# Patient Record
Sex: FEMALE | Race: WHITE
[De-identification: names, ages, dates, MRNs, and addresses within clinical notes are randomized per-mention and may not be internally consistent; named-entity substitution may affect disease eponyms.]

---

## 2017-01-17 ENCOUNTER — HOSPITAL ENCOUNTER (OUTPATIENT)
Dept: HOSPITAL 84 - D.MAMMO | Age: 72
Discharge: HOME | End: 2017-01-17
Attending: OBSTETRICS & GYNECOLOGY
Payer: MEDICARE

## 2017-01-17 DIAGNOSIS — Z12.31: Primary | ICD-10-CM

## 2018-01-23 ENCOUNTER — HOSPITAL ENCOUNTER (OUTPATIENT)
Dept: HOSPITAL 84 - D.MAMMO | Age: 73
Discharge: HOME | End: 2018-01-23
Attending: OBSTETRICS & GYNECOLOGY
Payer: MEDICARE

## 2018-01-23 DIAGNOSIS — Z12.31: Primary | ICD-10-CM

## 2018-08-01 ENCOUNTER — HOSPITAL ENCOUNTER (OUTPATIENT)
Dept: HOSPITAL 84 - D.US | Age: 73
Discharge: HOME | End: 2018-08-01
Attending: NURSE PRACTITIONER
Payer: MEDICARE

## 2018-08-01 DIAGNOSIS — R22.2: Primary | ICD-10-CM

## 2019-01-25 ENCOUNTER — HOSPITAL ENCOUNTER (OUTPATIENT)
Dept: HOSPITAL 84 - D.MAMMO | Age: 74
Discharge: HOME | End: 2019-01-25
Attending: OBSTETRICS & GYNECOLOGY
Payer: MEDICARE

## 2019-01-25 DIAGNOSIS — Z12.31: Primary | ICD-10-CM

## 2019-02-20 ENCOUNTER — HOSPITAL ENCOUNTER (OUTPATIENT)
Dept: HOSPITAL 84 - D.MAMMO | Age: 74
Discharge: HOME | End: 2019-02-20
Attending: OBSTETRICS & GYNECOLOGY
Payer: MEDICARE

## 2019-02-20 DIAGNOSIS — R92.8: Primary | ICD-10-CM

## 2019-04-12 LAB
ERYTHROCYTE [DISTWIDTH] IN BLOOD BY AUTOMATED COUNT: 13.2 % (ref 11.5–14.5)
HCT VFR BLD CALC: 37.5 % (ref 36–48)
HGB BLD-MCNC: 13.3 G/DL (ref 12–16)
LYMPHOCYTES NFR BLD AUTO: 28.1 % (ref 15–50)
MCH RBC QN AUTO: 33.8 PG (ref 26–34)
MCHC RBC AUTO-ENTMCNC: 35.5 G/DL (ref 31–37)
MCV RBC: 95.4 FL (ref 80–100)
NEUTROPHILS NFR BLD AUTO: 61.1 % (ref 40–80)
PLATELET # BLD: 181 10X3/UL (ref 130–400)
PMV BLD AUTO: 9.3 FL (ref 7.4–10.4)
RBC # BLD AUTO: 3.93 10X6/UL (ref 4–5.4)
WBC # BLD AUTO: 3.2 10X3/UL (ref 4.8–10.8)

## 2019-04-15 ENCOUNTER — HOSPITAL ENCOUNTER (OUTPATIENT)
Dept: HOSPITAL 84 - D.OPS | Age: 74
Discharge: HOME | End: 2019-04-15
Payer: MEDICARE

## 2019-04-15 VITALS — BODY MASS INDEX: 24.4 KG/M2

## 2019-04-15 VITALS — DIASTOLIC BLOOD PRESSURE: 65 MMHG | SYSTOLIC BLOOD PRESSURE: 137 MMHG

## 2019-04-15 DIAGNOSIS — Z01.812: ICD-10-CM

## 2019-04-15 DIAGNOSIS — N71.1: ICD-10-CM

## 2019-04-15 DIAGNOSIS — N95.0: Primary | ICD-10-CM

## 2019-04-18 NOTE — OP
PATIENT NAME:  ESTEPHANIE CHANEL                              MEDICAL RECORD: [id number]
:47                                             LOCATION:D.OPS          
                                                         ADMISSION DATE:        
SURGEON:  RYLEE MONTANO MD          
 
 
DATE OF OPERATION:  04/15/2019
 
PREOPERATIVE DIAGNOSIS:  Postmenopausal bleeding.
 
POSTOPERATIVE DIAGNOSIS:
1.  Postmenopausal bleeding.
2.  Uterine atrophy.
 
PROCEDURE:  Hysteroscopy with dilation and curettage.
 
SURGEON:  Rylee Montano MD
 
CRNA:  Hardy Silva.
 
ANESTHESIOLOGIST:  Dr. Hernandez.
 
ANESTHETIC:  General.
 
FINDINGS:  Cervix and vaginal mucosa unremarkable.  At the time of hysteroscopy,
thin lining noted.  No masses.  Scant tissue returned at the time of D&C.
 
SPECIMEN REMOVED:  Uterine curettings.
 
SPECIMEN DISPOSITION:  Pathology.
 
ESTIMATED BLOOD LOSS:  Minimal.
 
FLUIDS:  600 cc lactated Ringer's.
 
URINE OUTPUT:  Quantity sufficient cath prior to this procedure.
 
COMPLICATIONS:  None.
 
DRAINS:  None.
 
INDICATIONS:  The patient is a 73-year-old female who continues to take hormone
replacement therapy with vaginal bleeding.  The patient had an increased
endometrial stripe on ultrasound and was consented for dilation and curettage
with hysteroscopy.
 
DESCRIPTION OF PROCEDURE:  After informed consent was assured, the patient was
taken to the operating room where anesthetic was obtained.  The patient was now
prepped and draped in the usual sterile fashion.  A speculum was introduced into
the vagina.  The cervix grasped and the cervix dilated.  A hysteroscope was
introduced and the cervical os and ostia are visualized.  The uterine cavity was
visualized in its entirety without evidence of mass.  Hysteroscopy was now
discontinued and a cervix dilated to accommodate a #2 curette.  Curette was
passed gently to the fundus and pressure applied to the uterine wall as it is
withdrawn.  Good cry obtained throughout with tissue returned.  The specimen was
sent to the pathologist.  Single tooth tenaculum that was used to steady the
cervix during this case is removed and a ring forceps placed on the puncture
sites.  Sponge, lap, and needle counts correct times 2.  Ring forceps were
 
 
 
OPERATIVE REPORT                               [id number]    ESTEPHANIE CHANEL            
 
 
removed prior to the patient being transferred from the operative table to the
[facility].
 
TRANSINT:[id number] Voice Confirmation ID: [id number] DOCUMENT ID: [id number]
                                           
                                           RYLEE MONTANO MD          
 
 
 
Electronically Signed by RYLEE MONTANO on 19 at 9029
 
 
 
 
 
 
 
 
 
 
 
 
 
 
 
 
 
 
 
 
 
 
 
 
 
 
 
 
 
 
 
 
 
 
 
 
 
CC:                                                             [id number]
DICTATION DATE: 04/15/19 1479     :     04/15/19 2237      [facility] 
                                                                      04/15/19
[id number] [place] [address]

## 2019-08-21 ENCOUNTER — HOSPITAL ENCOUNTER (OUTPATIENT)
Dept: HOSPITAL 84 - D.MAMMO | Age: 74
Discharge: HOME | End: 2019-08-21
Attending: OBSTETRICS & GYNECOLOGY
Payer: MEDICARE

## 2019-08-21 VITALS — BODY MASS INDEX: 24.4 KG/M2

## 2019-08-21 DIAGNOSIS — R92.8: Primary | ICD-10-CM

## 2019-08-28 ENCOUNTER — HOSPITAL ENCOUNTER (OUTPATIENT)
Dept: HOSPITAL 84 - D.US | Age: 74
Discharge: HOME | End: 2019-08-28
Attending: OBSTETRICS & GYNECOLOGY
Payer: MEDICARE

## 2019-08-28 VITALS — BODY MASS INDEX: 24.4 KG/M2

## 2019-08-28 DIAGNOSIS — N60.01: Primary | ICD-10-CM

## 2019-08-28 DIAGNOSIS — R92.8: ICD-10-CM

## 2019-10-04 LAB
ANION GAP SERPL CALC-SCNC: 11.3 MMOL/L (ref 8–16)
BASOPHILS NFR BLD AUTO: 1 % (ref 0–2)
BUN SERPL-MCNC: 9 MG/DL (ref 7–18)
CALCIUM SERPL-MCNC: 9.5 MG/DL (ref 8.5–10.1)
CHLORIDE SERPL-SCNC: 104 MMOL/L (ref 98–107)
CO2 SERPL-SCNC: 30.9 MMOL/L (ref 21–32)
CREAT SERPL-MCNC: 0.6 MG/DL (ref 0.6–1.3)
EOSINOPHIL NFR BLD: 2.3 % (ref 0–7)
ERYTHROCYTE [DISTWIDTH] IN BLOOD BY AUTOMATED COUNT: 13 % (ref 11.5–14.5)
GLUCOSE SERPL-MCNC: 96 MG/DL (ref 74–106)
HCT VFR BLD CALC: 41.5 % (ref 36–48)
HGB BLD-MCNC: 14 G/DL (ref 12–16)
IMM GRANULOCYTES NFR BLD: 0 % (ref 0–5)
LYMPHOCYTES NFR BLD AUTO: 27.8 % (ref 15–50)
MCH RBC QN AUTO: 33.3 PG (ref 26–34)
MCHC RBC AUTO-ENTMCNC: 33.7 G/DL (ref 31–37)
MCV RBC: 98.8 FL (ref 80–100)
MONOCYTES NFR BLD: 8.7 % (ref 2–11)
NEUTROPHILS NFR BLD AUTO: 60.2 % (ref 40–80)
OSMOLALITY SERPL CALC.SUM OF ELEC: 281 MOSM/KG (ref 275–300)
PLATELET # BLD: 224 10X3/UL (ref 130–400)
PMV BLD AUTO: 9.7 FL (ref 7.4–10.4)
POTASSIUM SERPL-SCNC: 4.2 MMOL/L (ref 3.5–5.1)
RBC # BLD AUTO: 4.2 10X6/UL (ref 4–5.4)
SODIUM SERPL-SCNC: 142 MMOL/L (ref 136–145)
WBC # BLD AUTO: 3.1 10X3/UL (ref 4.8–10.8)

## 2019-10-07 ENCOUNTER — HOSPITAL ENCOUNTER (OUTPATIENT)
Dept: HOSPITAL 84 - OBSVTIME | Age: 74
LOS: 1 days | Discharge: HOME | End: 2019-10-08
Attending: OBSTETRICS & GYNECOLOGY
Payer: MEDICARE

## 2019-10-07 VITALS — SYSTOLIC BLOOD PRESSURE: 124 MMHG | DIASTOLIC BLOOD PRESSURE: 65 MMHG

## 2019-10-07 VITALS — SYSTOLIC BLOOD PRESSURE: 118 MMHG | DIASTOLIC BLOOD PRESSURE: 60 MMHG

## 2019-10-07 VITALS — DIASTOLIC BLOOD PRESSURE: 66 MMHG | SYSTOLIC BLOOD PRESSURE: 130 MMHG

## 2019-10-07 VITALS — WEIGHT: 132.28 LBS | BODY MASS INDEX: 24.34 KG/M2 | HEIGHT: 62 IN

## 2019-10-07 VITALS — DIASTOLIC BLOOD PRESSURE: 82 MMHG | SYSTOLIC BLOOD PRESSURE: 139 MMHG

## 2019-10-07 VITALS — SYSTOLIC BLOOD PRESSURE: 137 MMHG | DIASTOLIC BLOOD PRESSURE: 66 MMHG

## 2019-10-07 VITALS — SYSTOLIC BLOOD PRESSURE: 112 MMHG | DIASTOLIC BLOOD PRESSURE: 61 MMHG

## 2019-10-07 VITALS — SYSTOLIC BLOOD PRESSURE: 135 MMHG | DIASTOLIC BLOOD PRESSURE: 62 MMHG

## 2019-10-07 VITALS — SYSTOLIC BLOOD PRESSURE: 124 MMHG | DIASTOLIC BLOOD PRESSURE: 61 MMHG

## 2019-10-07 VITALS — SYSTOLIC BLOOD PRESSURE: 110 MMHG | DIASTOLIC BLOOD PRESSURE: 59 MMHG

## 2019-10-07 DIAGNOSIS — N81.2: ICD-10-CM

## 2019-10-07 DIAGNOSIS — R87.619: ICD-10-CM

## 2019-10-07 DIAGNOSIS — N73.6: ICD-10-CM

## 2019-10-07 DIAGNOSIS — N95.8: Primary | ICD-10-CM

## 2019-10-07 DIAGNOSIS — N95.0: ICD-10-CM

## 2019-10-07 NOTE — NUR
SPOKE WITH DR. LAUREANO IN REGARDS TO PATIENT'S REQUEST. DR. LAUREANO ORDERED
PATIENT'S HOME DOSE OF PEPCID, 20MG BID.

## 2019-10-07 NOTE — NUR
PATIENT RESTINGI IN BED AND DENIES NEEDS AT THIS TIME. BED IN LOWEST POSITION
AND CALL LIGHT WITHIN REACH. VSS. ENCOURAGED THE PATIENT TO CALL IF SHE HAS
NEEDS. WILL CONTINUE TO MONITOR.

## 2019-10-07 NOTE — NUR
RCV`D PT FROM RECOVERY ROOM VIA HOSPITAL STAFF AND BED. PT IS STILL GROGGY BUT
ALERT AND ORIENTED.  IS AT THE BEDSIDE. IV LOCATED TO LEFT HAND.
CURRENTLY RCVING 2L VIA NC. V/S ARE STABLE. NO S/S OF DISTRESS AT THIS TIME,
DENIES NEEDS, WILL CONT TO MONITOR.

## 2019-10-08 VITALS — DIASTOLIC BLOOD PRESSURE: 50 MMHG | SYSTOLIC BLOOD PRESSURE: 100 MMHG

## 2019-10-08 VITALS — SYSTOLIC BLOOD PRESSURE: 102 MMHG | DIASTOLIC BLOOD PRESSURE: 59 MMHG

## 2019-10-08 VITALS — DIASTOLIC BLOOD PRESSURE: 52 MMHG | SYSTOLIC BLOOD PRESSURE: 101 MMHG

## 2019-10-08 VITALS — DIASTOLIC BLOOD PRESSURE: 54 MMHG | SYSTOLIC BLOOD PRESSURE: 96 MMHG

## 2019-10-08 LAB
ERYTHROCYTE [DISTWIDTH] IN BLOOD BY AUTOMATED COUNT: 12.7 % (ref 11.5–14.5)
HCT VFR BLD CALC: 32.6 % (ref 36–48)
HGB BLD-MCNC: 11.2 G/DL (ref 12–16)
MCH RBC QN AUTO: 33.6 PG (ref 26–34)
MCHC RBC AUTO-ENTMCNC: 34.4 G/DL (ref 31–37)
MCV RBC: 97.9 FL (ref 80–100)
PLATELET # BLD: 161 10X3/UL (ref 130–400)
PMV BLD AUTO: 9.9 FL (ref 7.4–10.4)
RBC # BLD AUTO: 3.33 10X6/UL (ref 4–5.4)
WBC # BLD AUTO: 4.7 10X3/UL (ref 4.8–10.8)

## 2019-10-08 NOTE — OP
PATIENT NAME:  ESTEPHANIE CHANEL                              MEDICAL RECORD: [id number]
:47                                             LOCATION:D.     D.1204
                                                         ADMISSION DATE:10/07/19
SURGEON:  JOURDAN MONTANO MD          
 
 
DATE OF OPERATION:  10/07/2019
 
PREOPERATIVE DIAGNOSES:
1.  Postmenopausal syndrome.
2.  Postmenopausal bleeding.
3.  Endometrial with atypical cells.
 
POSTOPERATIVE DIAGNOSES:
1.  Postmenopausal syndrome.
2.  Postmenopausal bleeding.
3.  Endometrial with atypical cells.
4.  Pelvic adhesions.
 
PROCEDURE:
1.  Diagnostic laparoscopy.
2.  Lysis of adhesions.
3.  Laparoscopic-assisted vaginal hysterectomy.
4.  Bilateral salpingo-oophorectomy.
 
SURGEON:  Jourdan Montano MD
 
ASSISTANT:  Dr. Fernandes.
 
CRNA:  Quinton Silva.
 
ANESTHESIOLOGIST:  Dr. Hernandez
 
ANESTHETIC:  General.
 
FINDINGS:  Uterus has first degree prolapse.  The vaginal vault was
unremarkable.  At the time of laparoscopy, adhesion of the left adnexa to the
sidewall was encountered.  The ovaries and tubes were otherwise unremarkable. 
What was visualized of the abdominal anatomy is unremarkable.
 
SPECIMENS REMOVED:
1.  Uterus with cervix.
2.  Bilateral tubes.
3.  Bilateral ovaries.
 
ESTIMATED BLOOD LOSS:  Less than or equal to 100 cc.
 
FLUIDS:  1300 cc lactated Ringer's.
 
URINE OUTPUT:  150 cc of clear urine.
 
COMPLICATIONS:  None.
 
DRAINS:  Spencer to gravity.
 
INDICATIONS:  The patient is a 74-year-old female who has been on cyclic hormone
replacement therapy for several years.  The patient developed postmenopausal
bleeding and endometrial sampling was performed.  The sampling showed
 
 
 
OPERATIVE REPORT                               [id number]    ESTEPHANIE CHANEL            
 
 
proliferative endometrium with atypical features.  The patient has severe
menopausal symptoms and wishes to continue on hormones.  The patient is
requesting definitive therapy.
 
DESCRIPTION OF PROCEDURE:  After informed consent was assured, the patient was
taken to the operating room where anesthetic was obtained.  The patient was now
prepped and draped in the usual sterile fashion after being placed in [facility].  After Spencer catheter started, attention was directed to the abdomen
where an incision was made at the umbilicus to accommodate a 5-mm trocar.  This
was inserted without difficulty.  Pneumoperitoneum was developed and the patient
was placed in Trendelenburg position.  The left and right lower quadrant ports
were now placed.  After placement of the ports, the right adnexa is elevated
with a grasper from the left.  Thunderbeat coagulation cutter was used to
separate the ovary and tube from its attachments to the infundibulopelvic
ligament.  This sequence of compression, desiccation, and dissection continued
underneath the ovary and tube across the round ligament and the anterior leaf of
the broad ligament was now opened.  The anterior leaf was developed all the way
to the midline.  The posterior leaf was now opened and the vessels on the right
side skeletonized, compressed, coagulated.  Attention was now directed to the
left side.  The adhesion in left adnexa was elevated with grasper from the
right.  Again, using the Thunderbeat coagulation cutter, the adhesions were
taken down and then the infundibulopelvic ligament identified compressed,
coagulated, and .  Dissection was carried out underneath the left tube
and ovary and across the left round ligament.  The anterior leaf of the broad
ligament was opened and the bladder flap fully developed.  The posterior leaf
was dissected free of the vascular bundle of the left side.  The vascular bundle
of the side is now compressed and coagulated.  The legs were positioned for the
vaginal portion of this case as pneumoperitoneum was released and the light
placed on standby.  Weighted speculum was introduced in the vagina and the
cervix grasped with Polk tenaculums.  The cervix is elevated and the posterior
cul-de-sac entered sharply.  Anteriorly, Bovie cautery was used to mobilize the
vaginal mucosa at the bladder fold.  Once the bladder was mobilized, the
uterosacral ligament on both right and left side, grasped with Nia clamps. 
These pedicles developed sharply and Nia stitches applied.  These were tagged
to be used later in the procedure.  Attention was directed back anteriorly where
the anterior pouch is now entered. A Kiera retractor was placed here.  The
remaining portion of the cardinal ligament was serially clamped, cut, and tied
until the uterus was removed.  At both pedicles of right and left side, a tie on
a pass and a fore-and-aft stitch was used to obtain hemostasis.  The cuff was
now closed in anterior to posterior fashion.  Anteriorly, care was taken to
incorporate the peritoneum of the bladder at the apex of the vaginal close. 
This close this continued until the level of the uterosacral ligaments reached
and they are plicated in the midline.  A separate stitch was placed through the
posterior cuff through the uterosacral complex and back out of the cuff.  The
running stitch now continues to close the mucosa.  The aforementioned stitch
goes from posterior cuff through uterosacral ligament.  It is now tied securing
the cuff to ligament support.  The pneumoperitoneum is now reestablished and the
patient is again placed in Trendelenburg position.  The patient has the pelvis
irrigated and irrigant removed.  Adequate hemostasis is visualized across the
operative field.  The uterosacral ligaments were well approximated in midline. 
Sponge, lap, needle counts were correct times 2.  The pneumoperitoneum was
released and the skin was closed.  Dermabond was applied.  The patient was taken
to recovery area in stable condition.
 
TRANSINT:[id number] Voice Confirmation ID: [id number] DOCUMENT ID: [id number]
 
 
 
OPERATIVE REPORT                               [id number]    ESTEPHANIE CHANEL,JOURDAN ELLIOTT MD          
 
 
 
Electronically Signed by JOURDAN MONTANO on 10/08/19 at 0742
 
 
 
 
 
 
 
 
 
 
 
 
 
 
 
 
 
 
 
 
 
 
 
 
 
 
 
 
 
 
 
 
 
 
 
 
 
 
 
 
 
CC:                                                             [id number]
DICTATION DATE: 10/07/19 0844     :     10/07/19 1034      ADM IN  
                                                                              
[facility]                                          
[address] [place] [address]

## 2019-10-08 NOTE — NUR
PT RESTING IN BED WITH EYES CLOSED, EASILY AROUSED TO SPEECH. ALERT AND
ORIENTED WITH NO S/S OF DISTRESS AT THIS TIME. AYERS CATH PRESENT PUTTING OUT
CLEAR YELLOW URINE. IV LOCATED TO LEFT HAND WITH LR RUNNING AND MORPHINE PCA
PRESENT. DENIES NEEDS AT THIS TIME, WILL CONT TO MONITOR.

## 2019-10-08 NOTE — NUR
WRITTEN SCRIPTS FOR TORADOL 10  MG #30 WITH NO REFILLS AND PEROCET 5 MG #25
WITH NO REFILLS GIVEN TO PATIENT.

## 2019-10-11 ENCOUNTER — HOSPITAL ENCOUNTER (OUTPATIENT)
Dept: HOSPITAL 84 - D.OPS | Age: 74
Discharge: HOME | End: 2019-10-11
Attending: OBSTETRICS & GYNECOLOGY
Payer: MEDICARE

## 2019-10-11 VITALS
HEIGHT: 62 IN | BODY MASS INDEX: 24.71 KG/M2 | WEIGHT: 134.28 LBS | WEIGHT: 134.28 LBS | HEIGHT: 62 IN | BODY MASS INDEX: 24.71 KG/M2

## 2019-10-11 VITALS — SYSTOLIC BLOOD PRESSURE: 117 MMHG | DIASTOLIC BLOOD PRESSURE: 73 MMHG

## 2019-10-11 DIAGNOSIS — K59.00: Primary | ICD-10-CM

## 2019-10-11 NOTE — NUR
1025 PRIOR TO STARTING ENEMA PROCESS, A RECTAL EXAM WAS PERFORMED. SOLID STOOL
PRESENT IN RECTAL VAULT.  STOOL WAS SOFT AND UNABLE TO EVACUATE.
1030 ENEMA PROCESS STARTED. PT NOT ABLE TO HOLD ALL OF 200CC OF LACTULOSE
SOLUTION AND LEAKING ON PADS. PT EVACUATED SMALL AMOUNTS OF STOOL WHILE
SITTING ON TOILET.
1038 RE-EXAMINED PT. LARGE AMOUNT OF STOOL IN RECTAL VAULT. DIGITALLY ABLE TO
REMOVE SMALL PIECE OF FORMED STOOL. 200 CC OF MORE LACTULOSE SOLUTION GIVEN
PER RECTUM. PT ABLE TO HOLD WITH MIMIMAL LEAKAGE.
1050 PT UP TO BR. PT EVACUATED A LARGE AMOUNT OF STOOL.
1055 PT STATES SHE FEELS MUCH BETTER. PT RE-EXAMINED. NO STOOL IN RECTAL VAULT
PER DIGITAL EXAM.  PT WANTS MORE OF SOLUTION TO TRY TO REMOVE ANY STOOL THAT
MAY STILL BE HIGHER.
1105 PT HELD 400CC LACTULOSE SOLUTION FOR 6-7 MINUTES. PT STATES SHE PASSED A
LOT OF LIQUID AND SOME PARTICLES OF DARK STOOL.
1135 PT READY FOR DISCHARGE AND SHE FEELS COMFORTABLE TO LEAVE. EDUCATION
MATERIAL FOR CONSTIPATION AND IMPACTION GIVEN TO PATIENT.

## 2020-01-15 ENCOUNTER — HOSPITAL ENCOUNTER (OUTPATIENT)
Dept: HOSPITAL 84 - D.MAMMO | Age: 75
Discharge: HOME | End: 2020-01-15
Attending: OBSTETRICS & GYNECOLOGY
Payer: MEDICARE

## 2020-01-15 VITALS — BODY MASS INDEX: 24.5 KG/M2

## 2020-01-15 DIAGNOSIS — Z12.31: Primary | ICD-10-CM

## 2020-08-24 ENCOUNTER — HOSPITAL ENCOUNTER (OUTPATIENT)
Dept: HOSPITAL 84 - D.MRI | Age: 75
Discharge: HOME | End: 2020-08-24
Attending: FAMILY MEDICINE
Payer: MEDICARE

## 2020-08-24 VITALS — BODY MASS INDEX: 24.5 KG/M2

## 2020-08-24 DIAGNOSIS — M25.512: Primary | ICD-10-CM

## 2020-09-09 ENCOUNTER — HOSPITAL ENCOUNTER (OUTPATIENT)
Dept: HOSPITAL 84 - D.MAMMO | Age: 75
Discharge: HOME | End: 2020-09-09
Attending: FAMILY MEDICINE
Payer: MEDICARE

## 2020-09-09 VITALS — BODY MASS INDEX: 24.5 KG/M2

## 2020-09-09 DIAGNOSIS — N63.22: Primary | ICD-10-CM

## 2021-03-10 ENCOUNTER — HOSPITAL ENCOUNTER (OUTPATIENT)
Dept: HOSPITAL 84 - D.MAMMO | Age: 76
Discharge: HOME | End: 2021-03-10
Attending: FAMILY MEDICINE
Payer: MEDICARE

## 2021-03-10 VITALS — BODY MASS INDEX: 24.5 KG/M2

## 2021-03-10 DIAGNOSIS — R92.8: Primary | ICD-10-CM
